# Patient Record
Sex: MALE
[De-identification: names, ages, dates, MRNs, and addresses within clinical notes are randomized per-mention and may not be internally consistent; named-entity substitution may affect disease eponyms.]

---

## 2018-05-21 ENCOUNTER — HOSPITAL (OUTPATIENT)
Dept: OTHER | Age: 48
End: 2018-05-21

## 2024-11-29 ENCOUNTER — HOSPITAL ENCOUNTER (OUTPATIENT)
Dept: CT IMAGING | Age: 54
Discharge: HOME | End: 2024-11-29
Attending: INTERNAL MEDICINE

## 2024-11-29 VITALS — SYSTOLIC BLOOD PRESSURE: 126 MMHG | DIASTOLIC BLOOD PRESSURE: 82 MMHG

## 2024-11-29 DIAGNOSIS — Z13.6 SCREENING FOR HEART DISEASE: ICD-10-CM

## 2024-11-29 NOTE — PROGRESS NOTES
Date of Service 11/29/2024    CM PATINO  Date of Birth 7/4/1970    Patient Age: 54 year old    ASSIGNED  PCP: Wei Quiroz MD  [facility] [id number]    Heart Scan Consult  Preliminary Heart Scan Score: 2897    Previous Screening  Heart Scan Completed Previously: Yes  Year of last heart scan: Patient believes he had this test done about 10+ years ago.  Score of last heart scan: Unknown  Peripheral Vascular Scan Completed Previously: Yes  Year of last PV screening: Uncertain of year; done thru [facility] services.       Risk Factors  Personal Risk Factors  Non-alterable Risk Factors: Personal History;Age;Gender;Family History  Alterable Risk Factors: High Blood Pressure;Abnormal Cholesterol;Lack of exercise          Blood Pressure     /82 (BP Location: Left arm)     (Normal =< 120/80,  Elevated = 120-129/ >80,  High Stage1 130-139/80-89 , Stage2 >140/>90)    Lipid Profile  Not completed, patient declined testing.    Cholesterol Goals  Value   Total  =< 200   HDL  = > 45 Men = > 55 Women   LDL   =< 100   Triglycerides  =< 150       Glucose and Hemoglobin A1C  No results found for: \"PGLU\", \"GLU\", \"A1C\"  (Normal Fasting Glucose < 100mg/dl )    Nurse Review  Risk factor information and results reviewed with Nurse: Yes    Recommended Follow Up:  Consult your physician regarding::   Final Heart Scan Report;  Discuss potential for Incidental Finding;  Discuss Potential for Score Variance      Recommendations for Change:  Nutrition Changes: Low Saturated Fat;Low Fat Dairy;Low Salt Eating;Increase Fiber    Cholesterol Modification (goal of therapy depends upon your risk):   Increase HDL (Healthy/Good) Normal >45 Men >55 Women;  Decrease LDL (Lousy/Bad) Ideal <100;  Decrease Triglycerides (Ugly) Normal <150    Exercise: Enhance Current Program         Weight Management: Decrease Current Weight         Repeat Heart Scan:   3 Years if Calcium Score is > 0.0;  Discuss with your Physician               [place] Recommended Resources:  Recommended Resources: Upcoming Classes, Medical Services and Health Library [email or web address];    PV Screening  Recommended PV Screening: Carotids;Abdomen;Ankle-Brachial Index (TOMEKA)      Other Resources:: Educational handouts provided.      Lucho REGALADO RN        Please Contact the Nurse Heart Line with any Questions or Concerns [phone].

## 2024-12-02 DIAGNOSIS — R93.1 ELEVATED CORONARY ARTERY CALCIUM SCORE: Primary | ICD-10-CM

## 2025-01-14 ENCOUNTER — MED REC SCAN ONLY (OUTPATIENT)
Dept: INTERNAL MEDICINE CLINIC | Facility: CLINIC | Age: 55
End: 2025-01-14